# Patient Record
Sex: FEMALE | Race: WHITE | NOT HISPANIC OR LATINO
[De-identification: names, ages, dates, MRNs, and addresses within clinical notes are randomized per-mention and may not be internally consistent; named-entity substitution may affect disease eponyms.]

---

## 2021-08-11 ENCOUNTER — ASOB RESULT (OUTPATIENT)
Age: 36
End: 2021-08-11

## 2021-08-11 ENCOUNTER — APPOINTMENT (OUTPATIENT)
Dept: ANTEPARTUM | Facility: CLINIC | Age: 36
End: 2021-08-11
Payer: COMMERCIAL

## 2021-08-11 PROCEDURE — 76813 OB US NUCHAL MEAS 1 GEST: CPT

## 2021-09-07 ENCOUNTER — ASOB RESULT (OUTPATIENT)
Age: 36
End: 2021-09-07

## 2021-09-07 ENCOUNTER — APPOINTMENT (OUTPATIENT)
Dept: ANTEPARTUM | Facility: CLINIC | Age: 36
End: 2021-09-07
Payer: COMMERCIAL

## 2021-09-07 PROBLEM — Z00.00 ENCOUNTER FOR PREVENTIVE HEALTH EXAMINATION: Status: ACTIVE | Noted: 2021-09-07

## 2021-09-07 PROCEDURE — 76811 OB US DETAILED SNGL FETUS: CPT

## 2021-09-07 PROCEDURE — 76817 TRANSVAGINAL US OBSTETRIC: CPT

## 2021-10-12 ENCOUNTER — APPOINTMENT (OUTPATIENT)
Dept: ANTEPARTUM | Facility: CLINIC | Age: 36
End: 2021-10-12
Payer: COMMERCIAL

## 2021-10-12 ENCOUNTER — ASOB RESULT (OUTPATIENT)
Age: 36
End: 2021-10-12

## 2021-10-12 PROCEDURE — 76816 OB US FOLLOW-UP PER FETUS: CPT

## 2021-10-12 PROCEDURE — 76817 TRANSVAGINAL US OBSTETRIC: CPT

## 2021-12-02 ENCOUNTER — ASOB RESULT (OUTPATIENT)
Age: 36
End: 2021-12-02

## 2021-12-02 ENCOUNTER — APPOINTMENT (OUTPATIENT)
Dept: ANTEPARTUM | Facility: CLINIC | Age: 36
End: 2021-12-02
Payer: COMMERCIAL

## 2021-12-02 PROCEDURE — 99072 ADDL SUPL MATRL&STAF TM PHE: CPT

## 2021-12-02 PROCEDURE — 76819 FETAL BIOPHYS PROFIL W/O NST: CPT

## 2021-12-02 PROCEDURE — 76816 OB US FOLLOW-UP PER FETUS: CPT

## 2022-01-06 ENCOUNTER — APPOINTMENT (OUTPATIENT)
Dept: ANTEPARTUM | Facility: CLINIC | Age: 37
End: 2022-01-06
Payer: COMMERCIAL

## 2022-01-06 ENCOUNTER — ASOB RESULT (OUTPATIENT)
Age: 37
End: 2022-01-06

## 2022-01-06 PROCEDURE — 76819 FETAL BIOPHYS PROFIL W/O NST: CPT

## 2022-01-06 PROCEDURE — 76816 OB US FOLLOW-UP PER FETUS: CPT

## 2022-01-20 ENCOUNTER — APPOINTMENT (OUTPATIENT)
Dept: ANTEPARTUM | Facility: CLINIC | Age: 37
End: 2022-01-20
Payer: COMMERCIAL

## 2022-01-20 ENCOUNTER — ASOB RESULT (OUTPATIENT)
Age: 37
End: 2022-01-20

## 2022-01-20 PROCEDURE — 76816 OB US FOLLOW-UP PER FETUS: CPT

## 2022-01-20 PROCEDURE — 76818 FETAL BIOPHYS PROFILE W/NST: CPT

## 2022-02-28 ENCOUNTER — INPATIENT (INPATIENT)
Facility: HOSPITAL | Age: 37
LOS: 0 days | Discharge: HOME | DRG: 776 | End: 2022-02-28
Attending: OBSTETRICS & GYNECOLOGY | Admitting: OBSTETRICS & GYNECOLOGY
Payer: COMMERCIAL

## 2022-02-28 ENCOUNTER — APPOINTMENT (OUTPATIENT)
Dept: ANTEPARTUM | Facility: CLINIC | Age: 37
End: 2022-02-28

## 2022-02-28 ENCOUNTER — TRANSCRIPTION ENCOUNTER (OUTPATIENT)
Age: 37
End: 2022-02-28

## 2022-02-28 VITALS — HEIGHT: 66 IN | WEIGHT: 136.03 LBS

## 2022-02-28 VITALS
HEART RATE: 82 BPM | OXYGEN SATURATION: 99 % | SYSTOLIC BLOOD PRESSURE: 100 MMHG | DIASTOLIC BLOOD PRESSURE: 62 MMHG | RESPIRATION RATE: 18 BRPM | TEMPERATURE: 98 F

## 2022-02-28 LAB
ALBUMIN SERPL ELPH-MCNC: 3.9 G/DL — SIGNIFICANT CHANGE UP (ref 3.3–5)
ALP SERPL-CCNC: 232 U/L — HIGH (ref 40–120)
ALT FLD-CCNC: 9 U/L — LOW (ref 10–45)
ANION GAP SERPL CALC-SCNC: 20 MMOL/L — HIGH (ref 5–17)
ANISOCYTOSIS BLD QL: SLIGHT — SIGNIFICANT CHANGE UP
APTT BLD: 26.5 SEC — LOW (ref 27.5–35.5)
AST SERPL-CCNC: 30 U/L — SIGNIFICANT CHANGE UP (ref 10–40)
BASOPHILS # BLD AUTO: 0 K/UL — SIGNIFICANT CHANGE UP (ref 0–0.2)
BASOPHILS NFR BLD AUTO: 0 % — SIGNIFICANT CHANGE UP (ref 0–2)
BILIRUB SERPL-MCNC: 0.4 MG/DL — SIGNIFICANT CHANGE UP (ref 0.2–1.2)
BLD GP AB SCN SERPL QL: NEGATIVE — SIGNIFICANT CHANGE UP
BUN SERPL-MCNC: 11 MG/DL — SIGNIFICANT CHANGE UP (ref 7–23)
BURR CELLS BLD QL SMEAR: PRESENT — SIGNIFICANT CHANGE UP
CALCIUM SERPL-MCNC: 9.5 MG/DL — SIGNIFICANT CHANGE UP (ref 8.4–10.5)
CHLORIDE SERPL-SCNC: 95 MMOL/L — LOW (ref 96–108)
CO2 SERPL-SCNC: 13 MMOL/L — LOW (ref 22–31)
CREAT SERPL-MCNC: 0.64 MG/DL — SIGNIFICANT CHANGE UP (ref 0.5–1.3)
ELLIPTOCYTES BLD QL SMEAR: SLIGHT — SIGNIFICANT CHANGE UP
EOSINOPHIL # BLD AUTO: 0 K/UL — SIGNIFICANT CHANGE UP (ref 0–0.5)
EOSINOPHIL NFR BLD AUTO: 0 % — SIGNIFICANT CHANGE UP (ref 0–6)
GIANT PLATELETS BLD QL SMEAR: PRESENT — SIGNIFICANT CHANGE UP
GLUCOSE SERPL-MCNC: 243 MG/DL — HIGH (ref 70–99)
HCT VFR BLD CALC: 23.7 % — LOW (ref 34.5–45)
HCT VFR BLD CALC: 35.7 % — SIGNIFICANT CHANGE UP (ref 34.5–45)
HGB BLD-MCNC: 11.8 G/DL — SIGNIFICANT CHANGE UP (ref 11.5–15.5)
HGB BLD-MCNC: 7.9 G/DL — LOW (ref 11.5–15.5)
INR BLD: 0.91 — SIGNIFICANT CHANGE UP (ref 0.88–1.16)
LYMPHOCYTES # BLD AUTO: 1.33 K/UL — SIGNIFICANT CHANGE UP (ref 1–3.3)
LYMPHOCYTES # BLD AUTO: 4.4 % — LOW (ref 13–44)
MANUAL SMEAR VERIFICATION: SIGNIFICANT CHANGE UP
MCHC RBC-ENTMCNC: 29.2 PG — SIGNIFICANT CHANGE UP (ref 27–34)
MCHC RBC-ENTMCNC: 29.3 PG — SIGNIFICANT CHANGE UP (ref 27–34)
MCHC RBC-ENTMCNC: 33.1 GM/DL — SIGNIFICANT CHANGE UP (ref 32–36)
MCHC RBC-ENTMCNC: 33.3 GM/DL — SIGNIFICANT CHANGE UP (ref 32–36)
MCV RBC AUTO: 87.5 FL — SIGNIFICANT CHANGE UP (ref 80–100)
MCV RBC AUTO: 88.6 FL — SIGNIFICANT CHANGE UP (ref 80–100)
METAMYELOCYTES # FLD: 0.9 % — HIGH (ref 0–0)
MONOCYTES # BLD AUTO: 0.51 K/UL — SIGNIFICANT CHANGE UP (ref 0–0.9)
MONOCYTES NFR BLD AUTO: 1.7 % — LOW (ref 2–14)
NEUTROPHILS # BLD AUTO: 27.9 K/UL — HIGH (ref 1.8–7.4)
NEUTROPHILS NFR BLD AUTO: 91.2 % — HIGH (ref 43–77)
NEUTS BAND # BLD: 0.9 % — SIGNIFICANT CHANGE UP (ref 0–8)
PLAT MORPH BLD: NORMAL — SIGNIFICANT CHANGE UP
PLATELET # BLD AUTO: 192 K/UL — SIGNIFICANT CHANGE UP (ref 150–400)
PLATELET # BLD AUTO: 261 K/UL — SIGNIFICANT CHANGE UP (ref 150–400)
POIKILOCYTOSIS BLD QL AUTO: SLIGHT — SIGNIFICANT CHANGE UP
POLYCHROMASIA BLD QL SMEAR: SLIGHT — SIGNIFICANT CHANGE UP
POTASSIUM SERPL-MCNC: 4.4 MMOL/L — SIGNIFICANT CHANGE UP (ref 3.5–5.3)
POTASSIUM SERPL-SCNC: 4.4 MMOL/L — SIGNIFICANT CHANGE UP (ref 3.5–5.3)
PROT SERPL-MCNC: 7.3 G/DL — SIGNIFICANT CHANGE UP (ref 6–8.3)
PROTHROM AB SERPL-ACNC: 10.8 SEC — SIGNIFICANT CHANGE UP (ref 10.5–13.4)
RBC # BLD: 2.71 M/UL — LOW (ref 3.8–5.2)
RBC # BLD: 4.03 M/UL — SIGNIFICANT CHANGE UP (ref 3.8–5.2)
RBC # FLD: 14 % — SIGNIFICANT CHANGE UP (ref 10.3–14.5)
RBC # FLD: 14 % — SIGNIFICANT CHANGE UP (ref 10.3–14.5)
RBC BLD AUTO: ABNORMAL
RH IG SCN BLD-IMP: POSITIVE — SIGNIFICANT CHANGE UP
SARS-COV-2 RNA SPEC QL NAA+PROBE: NEGATIVE — SIGNIFICANT CHANGE UP
SODIUM SERPL-SCNC: 128 MMOL/L — LOW (ref 135–145)
VARIANT LYMPHS # BLD: 0.9 % — SIGNIFICANT CHANGE UP (ref 0–6)
WBC # BLD: 28.62 K/UL — HIGH (ref 3.8–10.5)
WBC # BLD: 30.29 K/UL — HIGH (ref 3.8–10.5)
WBC # FLD AUTO: 28.62 K/UL — HIGH (ref 3.8–10.5)
WBC # FLD AUTO: 30.29 K/UL — HIGH (ref 3.8–10.5)

## 2022-02-28 PROCEDURE — 80053 COMPREHEN METABOLIC PANEL: CPT

## 2022-02-28 PROCEDURE — 85610 PROTHROMBIN TIME: CPT

## 2022-02-28 PROCEDURE — 85730 THROMBOPLASTIN TIME PARTIAL: CPT

## 2022-02-28 PROCEDURE — 86901 BLOOD TYPING SEROLOGIC RH(D): CPT

## 2022-02-28 PROCEDURE — 86850 RBC ANTIBODY SCREEN: CPT

## 2022-02-28 PROCEDURE — 36415 COLL VENOUS BLD VENIPUNCTURE: CPT

## 2022-02-28 PROCEDURE — 99285 EMERGENCY DEPT VISIT HI MDM: CPT

## 2022-02-28 PROCEDURE — 87635 SARS-COV-2 COVID-19 AMP PRB: CPT

## 2022-02-28 PROCEDURE — 86900 BLOOD TYPING SEROLOGIC ABO: CPT

## 2022-02-28 PROCEDURE — 85025 COMPLETE CBC W/AUTO DIFF WBC: CPT

## 2022-02-28 PROCEDURE — 99053 MED SERV 10PM-8AM 24 HR FAC: CPT

## 2022-02-28 RX ORDER — IBUPROFEN 200 MG
600 TABLET ORAL EVERY 6 HOURS
Refills: 0 | Status: DISCONTINUED | OUTPATIENT
Start: 2022-02-28 | End: 2022-02-28

## 2022-02-28 RX ORDER — AER TRAVELER 0.5 G/1
1 SOLUTION RECTAL; TOPICAL EVERY 4 HOURS
Refills: 0 | Status: DISCONTINUED | OUTPATIENT
Start: 2022-02-28 | End: 2022-02-28

## 2022-02-28 RX ORDER — KETOROLAC TROMETHAMINE 30 MG/ML
30 SYRINGE (ML) INJECTION ONCE
Refills: 0 | Status: DISCONTINUED | OUTPATIENT
Start: 2022-02-28 | End: 2022-02-28

## 2022-02-28 RX ORDER — BENZOCAINE 10 %
1 GEL (GRAM) MUCOUS MEMBRANE EVERY 6 HOURS
Refills: 0 | Status: DISCONTINUED | OUTPATIENT
Start: 2022-02-28 | End: 2022-02-28

## 2022-02-28 RX ORDER — POLYETHYLENE GLYCOL 3350 17 G/17G
17 POWDER, FOR SOLUTION ORAL DAILY
Refills: 0 | Status: DISCONTINUED | OUTPATIENT
Start: 2022-02-28 | End: 2022-02-28

## 2022-02-28 RX ORDER — SIMETHICONE 80 MG/1
80 TABLET, CHEWABLE ORAL EVERY 4 HOURS
Refills: 0 | Status: DISCONTINUED | OUTPATIENT
Start: 2022-02-28 | End: 2022-02-28

## 2022-02-28 RX ORDER — BENZOCAINE 10 %
1 GEL (GRAM) MUCOUS MEMBRANE
Qty: 0 | Refills: 0 | DISCHARGE
Start: 2022-02-28

## 2022-02-28 RX ORDER — IBUPROFEN 200 MG
600 TABLET ORAL EVERY 6 HOURS
Refills: 0 | Status: COMPLETED | OUTPATIENT
Start: 2022-02-28 | End: 2023-01-27

## 2022-02-28 RX ORDER — ACETAMINOPHEN 500 MG
3 TABLET ORAL
Qty: 0 | Refills: 0 | DISCHARGE
Start: 2022-02-28

## 2022-02-28 RX ORDER — HYDROCORTISONE 1 %
1 OINTMENT (GRAM) TOPICAL EVERY 6 HOURS
Refills: 0 | Status: DISCONTINUED | OUTPATIENT
Start: 2022-02-28 | End: 2022-02-28

## 2022-02-28 RX ORDER — IBUPROFEN 200 MG
1 TABLET ORAL
Qty: 0 | Refills: 0 | DISCHARGE
Start: 2022-02-28

## 2022-02-28 RX ORDER — ACETAMINOPHEN 500 MG
975 TABLET ORAL
Refills: 0 | Status: DISCONTINUED | OUTPATIENT
Start: 2022-02-28 | End: 2022-02-28

## 2022-02-28 RX ORDER — DIPHENOXYLATE HCL/ATROPINE 2.5-.025MG
1 TABLET ORAL ONCE
Refills: 0 | Status: DISCONTINUED | OUTPATIENT
Start: 2022-02-28 | End: 2022-02-28

## 2022-02-28 RX ORDER — TETANUS TOXOID, REDUCED DIPHTHERIA TOXOID AND ACELLULAR PERTUSSIS VACCINE, ADSORBED 5; 2.5; 8; 8; 2.5 [IU]/.5ML; [IU]/.5ML; UG/.5ML; UG/.5ML; UG/.5ML
0.5 SUSPENSION INTRAMUSCULAR ONCE
Refills: 0 | Status: DISCONTINUED | OUTPATIENT
Start: 2022-02-28 | End: 2022-02-28

## 2022-02-28 RX ORDER — DIBUCAINE 1 %
1 OINTMENT (GRAM) RECTAL EVERY 6 HOURS
Refills: 0 | Status: DISCONTINUED | OUTPATIENT
Start: 2022-02-28 | End: 2022-02-28

## 2022-02-28 RX ORDER — LANOLIN
1 OINTMENT (GRAM) TOPICAL EVERY 6 HOURS
Refills: 0 | Status: DISCONTINUED | OUTPATIENT
Start: 2022-02-28 | End: 2022-02-28

## 2022-02-28 RX ORDER — MAGNESIUM HYDROXIDE 400 MG/1
30 TABLET, CHEWABLE ORAL
Refills: 0 | Status: DISCONTINUED | OUTPATIENT
Start: 2022-02-28 | End: 2022-02-28

## 2022-02-28 RX ORDER — SODIUM CHLORIDE 9 MG/ML
3 INJECTION INTRAMUSCULAR; INTRAVENOUS; SUBCUTANEOUS EVERY 8 HOURS
Refills: 0 | Status: DISCONTINUED | OUTPATIENT
Start: 2022-02-28 | End: 2022-02-28

## 2022-02-28 RX ORDER — CARBOPROST TROMETHAMINE 250 UG/ML
0.25 INJECTION, SOLUTION INTRAMUSCULAR ONCE
Refills: 0 | Status: COMPLETED | OUTPATIENT
Start: 2022-02-28 | End: 2022-02-28

## 2022-02-28 RX ORDER — PRAMOXINE HYDROCHLORIDE 150 MG/15G
1 AEROSOL, FOAM RECTAL EVERY 4 HOURS
Refills: 0 | Status: DISCONTINUED | OUTPATIENT
Start: 2022-02-28 | End: 2022-02-28

## 2022-02-28 RX ORDER — DIPHENHYDRAMINE HCL 50 MG
25 CAPSULE ORAL EVERY 6 HOURS
Refills: 0 | Status: DISCONTINUED | OUTPATIENT
Start: 2022-02-28 | End: 2022-02-28

## 2022-02-28 RX ORDER — OXYTOCIN 10 UNIT/ML
333.33 VIAL (ML) INJECTION
Qty: 20 | Refills: 0 | Status: DISCONTINUED | OUTPATIENT
Start: 2022-02-28 | End: 2022-02-28

## 2022-02-28 RX ORDER — ONDANSETRON 8 MG/1
8 TABLET, FILM COATED ORAL ONCE
Refills: 0 | Status: COMPLETED | OUTPATIENT
Start: 2022-02-28 | End: 2022-02-28

## 2022-02-28 RX ADMIN — Medication 600 MILLIGRAM(S): at 18:57

## 2022-02-28 RX ADMIN — Medication 600 MILLIGRAM(S): at 18:32

## 2022-02-28 RX ADMIN — POLYETHYLENE GLYCOL 3350 17 GRAM(S): 17 POWDER, FOR SOLUTION ORAL at 18:36

## 2022-02-28 RX ADMIN — Medication 30 MILLIGRAM(S): at 02:02

## 2022-02-28 RX ADMIN — Medication 1 TABLET(S): at 02:02

## 2022-02-28 RX ADMIN — SODIUM CHLORIDE 3 MILLILITER(S): 9 INJECTION INTRAMUSCULAR; INTRAVENOUS; SUBCUTANEOUS at 16:15

## 2022-02-28 RX ADMIN — CARBOPROST TROMETHAMINE 0.25 MICROGRAM(S): 250 INJECTION, SOLUTION INTRAMUSCULAR at 01:18

## 2022-02-28 RX ADMIN — Medication 975 MILLIGRAM(S): at 02:02

## 2022-02-28 RX ADMIN — ONDANSETRON 8 MILLIGRAM(S): 8 TABLET, FILM COATED ORAL at 01:15

## 2022-02-28 NOTE — ED ADULT NURSE NOTE - OBJECTIVE STATEMENT
Patient is a 36 year old female, brought in by EMS for post-partum bleeding following a provider supervised home birth. Bleeding complication noted by provider and EMS was called for acute L/D care transfer. Patient awake, alert, verbally responsive and coherent.     OB Rapid Response activated with ED contact. ED provider completed Emergency Medical screening. Final disposition of the patient for admission to Acute L/D. L/D team notified at bedside with Rapid Response evaluation.

## 2022-02-28 NOTE — ED PROVIDER NOTE - CLINICAL SUMMARY MEDICAL DECISION MAKING FREE TEXT BOX
37 yo F, otherwise healthy,  s/p vaginal delivery at home with a midwife presents to ED via EMS with post-partum hemorrhage. Pt was at 41 weeks gestational age when she gave birth to her son at home at 11.03 pm. Placenta was delivered at 11.10 pm. Pt subsequently had "I liter of hemorrhage" per midwife and was given Pitocin, Methergine and Misoprostol by the midwife- EMS was called and pt was BIBA to ED with her . Pt c/o feeling cold and nervous. HR 120s and BP 90/60s per EMS.  ED course: VS noted. Pt tachycardic with HR 120s and BP 90s/50s. Obstetrics team in ED to see pt. Pt taken to L&D for further treatment. Stable for transport. 37 yo F, otherwise healthy,  s/p vaginal delivery at home with a midwife presents to ED via EMS with post-partum hemorrhage. Pt was at 41 weeks gestational age when she gave birth to her son at home at 11.03 pm. Placenta was delivered at 11.10 pm. Pt subsequently had "I liter of hemorrhage" per midwife and was given Pitocin, Methergine and Misoprostol by the midwife- EMS was called and pt was BIBA to ED with her . Pt c/o feeling cold and nervous. HR 120s and BP 90/60s per EMS.  ED course: VS noted. Pt tachycardic with HR 120s and BP 90s/50s. Labs including Type sent. Obstetrics team in ED to see pt. Pt taken to L&D for further treatment. Stable for transport.

## 2022-02-28 NOTE — PATIENT PROFILE OB - FALL HARM RISK - HARM RISK INTERVENTIONS

## 2022-02-28 NOTE — PROGRESS NOTE ADULT - ASSESSMENT
A/P 36y s/p home  c/b PPH, PPD #1  , stable, meeting postpartum milestones   - PPH: currently normal lochia. The nurse reports 1 episode of syncope ON. vital signs taken an hour after and were wnl.  Night team was not notified. currently, lying in bed and  no complains, normotensive. Ambulation carefully with assistance. f/u  morning cbc.  - Pain: well controlled on OPM  - GI: Tolerating regular diet  - : urinating without difficulty/pain  -DVT prophylaxis: ambulating frequently  -Dispo: PPD 2, unless otherwise specified

## 2022-02-28 NOTE — ED PROVIDER NOTE - CHIEF COMPLAINT
The patient is a 36y Female complaining of  The patient is a 36y Female presenting with post-partum hemorrhage

## 2022-02-28 NOTE — DISCHARGE NOTE OB - CARE PROVIDER_API CALL
Jurgen Ocasio  OBSTETRICS AND GYNECOLOGY  [address] [place] [id number]  Phone: [phone]  Fax: [phone]  Follow Up Time: 1 month

## 2022-02-28 NOTE — DISCHARGE NOTE OB - HOSPITAL COURSE
S/p extramural vaginal delivery on 2/27 with an EBL of 1200cc (per midwife), pt received 30U IM pit, cytotec 200 sublingual, cytotec 200 HI, methergine x1 by midwife at home.    Pt given hemabate x1 in LDR and first degree lac repaired by Dr. Chinchilla.  Uneventful course here, stable

## 2022-02-28 NOTE — DISCHARGE NOTE OB - PATIENT PORTAL LINK FT
You can access the FollowMyHealth Patient Portal offered by [facility] by registering at the following website: [email or web address]. By joining [facility]’s FollowMyHealth portal, you will also be able to view your health information using other applications (apps) compatible with our system.

## 2022-02-28 NOTE — DISCHARGE NOTE OB - PLAN OF CARE
Please follow-up with your OB doctor within 6 weeks.  You can resume a regular diet at home and may continue your prenatal vitamins as directed.  Please place nothing in the vagina for 6 weeks (no tampons, sex, douching, tub baths, swimming pools, etc).  If you have severe headaches and/or vision changes, heavy bleeding, or chest pain, please call your provider or go to the nearest Emergency Department.  Please call your OB with any signs of symptoms of infection including fever > 100.4 degrees, severe pain, malodorous vaginal discharge or heavy bleeding requiring more than 1-2 pads/hour.  You can take Motrin 600mg orally every 6 hours for pain as needed.

## 2022-02-28 NOTE — DISCHARGE NOTE OB - CARE PLAN
Principal Discharge DX:	Postpartum hemorrhage  Assessment and plan of treatment:	Please follow-up with your OB doctor within 6 weeks.  You can resume a regular diet at home and may continue your prenatal vitamins as directed.  Please place nothing in the vagina for 6 weeks (no tampons, sex, douching, tub baths, swimming pools, etc).  If you have severe headaches and/or vision changes, heavy bleeding, or chest pain, please call your provider or go to the nearest Emergency Department.  Please call your OB with any signs of symptoms of infection including fever > 100.4 degrees, severe pain, malodorous vaginal discharge or heavy bleeding requiring more than 1-2 pads/hour.  You can take Motrin 600mg orally every 6 hours for pain as needed.   1

## 2022-02-28 NOTE — PATIENT PROFILE OB - WEIGHT PRE-PREGNANCY IN KG
----- Message from Sharlene Gottlieb sent at 7/24/2017 11:52 AM CDT -----  Contact: Patient  Patient is requesting a call back in regards to a letter that was sent to his insurance.    Please fax letter to patient [phone].    Please call patient at [phone].  
Clinic encounter from 1/30/17 has been addended.  
Did you send a letter or fill out disability papers for this patient. Just trying to find out what he is calling about before I call  Him back. I did not take  His call last week, I was out of the clinic but there is a phone note from 7/19/17  
Dr Segal edited note stating no neuropathy. Spoke with Elicia in Medical Records Release of information who will re submit this clinic note to Pedro. I faxed the corrected note to patient and put Elicia's contact information on the cover sheet for future reference for the patient  
LVM that I returned his call  
Please addend his note so I can mail to patient with proof that it was addended.   
Please ask patient what is required. Letter or addendum to the clinic note. I can addend my last clinic encounter.   
51

## 2022-02-28 NOTE — DISCHARGE NOTE OB - NS MD DC FALL RISK RISK
For information on Fall & Injury Prevention, visit: [email or web address] OR  [email or web address] OR  https://www.cdc.gov/steadi/patient.html

## 2022-02-28 NOTE — ED PROVIDER NOTE - WET READ LAUNCH FT
How Severe Is Your Psoriasis?: mild Do You Have A Family History Of Psoriasis?: no Is This A New Presentation, Or A Follow-Up?: Psoriasis There are no Wet Read(s) to document.

## 2022-02-28 NOTE — LACTATION INITIAL EVALUATION - LACTATION INTERVENTIONS
initiate/review safe skin-to-skin/initiate/review hand expression/initiate/review techniques for position and latch/reviewed components of an effective feeding and at least 8 effective feedings per day required/reviewed importance of monitoring infant diapers, the breastfeeding log, and minimum output each day/reviewed benefits and recommendations for rooming in/reviewed feeding on demand/by cue at least 8 times a day

## 2022-02-28 NOTE — ED ADULT TRIAGE NOTE - CHIEF COMPLAINT QUOTE
Reported [place] birth at home. Patient assisted with holistic provider at home with normal vaginal delivery. Reported placental delivery following birth - placental remains brought with provider with  and mother. EMS activated for mother and  transfer.

## 2022-02-28 NOTE — ED PROVIDER NOTE - OBJECTIVE STATEMENT
35 yo F, otherwise healthy,  s/p vaginal delivery at home with a midwife presents to ED via EMS with post-partum hemorrhage. Pt was at 41 weeks gestational age when she gave birth to her son at home at 11.03 pm. Placenta was delivered at 11.10 pm. Pt subsequently had "I liter of hemorrhage" per midwife and was given Pitocin, Methergine and Misoprostol by the midwife- EMS was called and pt was BIBA to ED with her . Pt c/o feeling cold and nervous. HR 120s and BP 90/60s per EMS.

## 2022-02-28 NOTE — LACTATION INITIAL EVALUATION - NS LACT CON REASON FOR REQ
Multip mom, third baby,  older children one year each reporting a good milk supply each time. Baby voiding and stooling well. Breastfeeding in cradle hold, positioning and latch strategies taught. baby deeply latches and feeds with an organize, rhythmic suck. Breastfeeding education provided. Skin to skin contact and rooming-in encouraged./multiparous mom

## 2022-02-28 NOTE — ED ADULT NURSE NOTE - NSIMPLEMENTINTERV_GEN_ALL_ED
Implemented All Universal Safety Interventions:  [place] to call system. Call bell, personal items and telephone within reach. Instruct patient to call for assistance. Room bathroom lighting operational. Non-slip footwear when patient is off stretcher. Physically safe environment: no spills, clutter or unnecessary equipment. Stretcher in lowest position, wheels locked, appropriate side rails in place.

## 2022-03-03 DIAGNOSIS — R00.0 TACHYCARDIA, UNSPECIFIED: ICD-10-CM

## 2022-03-03 DIAGNOSIS — R55 SYNCOPE AND COLLAPSE: ICD-10-CM
